# Patient Record
Sex: MALE | Race: BLACK OR AFRICAN AMERICAN | NOT HISPANIC OR LATINO | URBAN - METROPOLITAN AREA
[De-identification: names, ages, dates, MRNs, and addresses within clinical notes are randomized per-mention and may not be internally consistent; named-entity substitution may affect disease eponyms.]

---

## 2018-08-01 ENCOUNTER — INPATIENT (INPATIENT)
Facility: HOSPITAL | Age: 53
LOS: 0 days | Discharge: HOME | End: 2018-08-02
Attending: INTERNAL MEDICINE | Admitting: INTERNAL MEDICINE

## 2018-08-01 VITALS
SYSTOLIC BLOOD PRESSURE: 162 MMHG | TEMPERATURE: 98 F | RESPIRATION RATE: 19 BRPM | DIASTOLIC BLOOD PRESSURE: 102 MMHG | OXYGEN SATURATION: 100 % | HEART RATE: 76 BPM

## 2018-08-01 LAB
ALBUMIN SERPL ELPH-MCNC: 4.5 G/DL — SIGNIFICANT CHANGE UP (ref 3.5–5.2)
ALBUMIN SERPL ELPH-MCNC: 4.7 G/DL — SIGNIFICANT CHANGE UP (ref 3.5–5.2)
ALP SERPL-CCNC: 91 U/L — SIGNIFICANT CHANGE UP (ref 30–115)
ALP SERPL-CCNC: 97 U/L — SIGNIFICANT CHANGE UP (ref 30–115)
ALT FLD-CCNC: 52 U/L — HIGH (ref 0–41)
ALT FLD-CCNC: 53 U/L — HIGH (ref 0–41)
ANION GAP SERPL CALC-SCNC: 14 MMOL/L — SIGNIFICANT CHANGE UP (ref 7–14)
ANION GAP SERPL CALC-SCNC: 15 MMOL/L — HIGH (ref 7–14)
APTT BLD: 32.1 SEC — SIGNIFICANT CHANGE UP (ref 27–39.2)
AST SERPL-CCNC: 39 U/L — SIGNIFICANT CHANGE UP (ref 0–41)
AST SERPL-CCNC: 53 U/L — HIGH (ref 0–41)
BASOPHILS # BLD AUTO: 0.06 K/UL — SIGNIFICANT CHANGE UP (ref 0–0.2)
BASOPHILS NFR BLD AUTO: 1 % — SIGNIFICANT CHANGE UP (ref 0–1)
BILIRUB SERPL-MCNC: 0.6 MG/DL — SIGNIFICANT CHANGE UP (ref 0.2–1.2)
BILIRUB SERPL-MCNC: 0.7 MG/DL — SIGNIFICANT CHANGE UP (ref 0.2–1.2)
BUN SERPL-MCNC: 13 MG/DL — SIGNIFICANT CHANGE UP (ref 10–20)
BUN SERPL-MCNC: 14 MG/DL — SIGNIFICANT CHANGE UP (ref 10–20)
CALCIUM SERPL-MCNC: 9.2 MG/DL — SIGNIFICANT CHANGE UP (ref 8.5–10.1)
CALCIUM SERPL-MCNC: 9.2 MG/DL — SIGNIFICANT CHANGE UP (ref 8.5–10.1)
CHLORIDE SERPL-SCNC: 101 MMOL/L — SIGNIFICANT CHANGE UP (ref 98–110)
CHLORIDE SERPL-SCNC: 104 MMOL/L — SIGNIFICANT CHANGE UP (ref 98–110)
CK SERPL-CCNC: 992 U/L — HIGH (ref 0–225)
CO2 SERPL-SCNC: 23 MMOL/L — SIGNIFICANT CHANGE UP (ref 17–32)
CO2 SERPL-SCNC: 26 MMOL/L — SIGNIFICANT CHANGE UP (ref 17–32)
CREAT SERPL-MCNC: 0.9 MG/DL — SIGNIFICANT CHANGE UP (ref 0.7–1.5)
CREAT SERPL-MCNC: 1 MG/DL — SIGNIFICANT CHANGE UP (ref 0.7–1.5)
EOSINOPHIL # BLD AUTO: 0.18 K/UL — SIGNIFICANT CHANGE UP (ref 0–0.7)
EOSINOPHIL NFR BLD AUTO: 2.9 % — SIGNIFICANT CHANGE UP (ref 0–8)
GLUCOSE SERPL-MCNC: 90 MG/DL — SIGNIFICANT CHANGE UP (ref 70–99)
GLUCOSE SERPL-MCNC: 92 MG/DL — SIGNIFICANT CHANGE UP (ref 70–99)
HCT VFR BLD CALC: 40.5 % — LOW (ref 42–52)
HGB BLD-MCNC: 13.5 G/DL — LOW (ref 14–18)
IMM GRANULOCYTES NFR BLD AUTO: 0.3 % — SIGNIFICANT CHANGE UP (ref 0.1–0.3)
INR BLD: 1.07 RATIO — SIGNIFICANT CHANGE UP (ref 0.65–1.3)
LYMPHOCYTES # BLD AUTO: 2.5 K/UL — SIGNIFICANT CHANGE UP (ref 1.2–3.4)
LYMPHOCYTES # BLD AUTO: 40.5 % — SIGNIFICANT CHANGE UP (ref 20.5–51.1)
MCHC RBC-ENTMCNC: 28.7 PG — SIGNIFICANT CHANGE UP (ref 27–31)
MCHC RBC-ENTMCNC: 33.3 G/DL — SIGNIFICANT CHANGE UP (ref 32–37)
MCV RBC AUTO: 86.2 FL — SIGNIFICANT CHANGE UP (ref 80–94)
MONOCYTES # BLD AUTO: 0.37 K/UL — SIGNIFICANT CHANGE UP (ref 0.1–0.6)
MONOCYTES NFR BLD AUTO: 6 % — SIGNIFICANT CHANGE UP (ref 1.7–9.3)
NEUTROPHILS # BLD AUTO: 3.05 K/UL — SIGNIFICANT CHANGE UP (ref 1.4–6.5)
NEUTROPHILS NFR BLD AUTO: 49.3 % — SIGNIFICANT CHANGE UP (ref 42.2–75.2)
NRBC # BLD: 0 /100 WBCS — SIGNIFICANT CHANGE UP (ref 0–0)
PLATELET # BLD AUTO: 176 K/UL — SIGNIFICANT CHANGE UP (ref 130–400)
POTASSIUM SERPL-MCNC: 3.9 MMOL/L — SIGNIFICANT CHANGE UP (ref 3.5–5)
POTASSIUM SERPL-MCNC: 4.6 MMOL/L — SIGNIFICANT CHANGE UP (ref 3.5–5)
POTASSIUM SERPL-SCNC: 3.9 MMOL/L — SIGNIFICANT CHANGE UP (ref 3.5–5)
POTASSIUM SERPL-SCNC: 4.6 MMOL/L — SIGNIFICANT CHANGE UP (ref 3.5–5)
PROT SERPL-MCNC: 7.6 G/DL — SIGNIFICANT CHANGE UP (ref 6–8)
PROT SERPL-MCNC: 7.7 G/DL — SIGNIFICANT CHANGE UP (ref 6–8)
PROTHROM AB SERPL-ACNC: 11.5 SEC — SIGNIFICANT CHANGE UP (ref 9.95–12.87)
RBC # BLD: 4.7 M/UL — SIGNIFICANT CHANGE UP (ref 4.7–6.1)
RBC # FLD: 13.3 % — SIGNIFICANT CHANGE UP (ref 11.5–14.5)
SODIUM SERPL-SCNC: 141 MMOL/L — SIGNIFICANT CHANGE UP (ref 135–146)
SODIUM SERPL-SCNC: 142 MMOL/L — SIGNIFICANT CHANGE UP (ref 135–146)
TROPONIN T SERPL-MCNC: <0.01 NG/ML — SIGNIFICANT CHANGE UP
WBC # BLD: 6.18 K/UL — SIGNIFICANT CHANGE UP (ref 4.8–10.8)
WBC # FLD AUTO: 6.18 K/UL — SIGNIFICANT CHANGE UP (ref 4.8–10.8)

## 2018-08-01 RX ORDER — FLUTICASONE FUROATE AND VILANTEROL TRIFENATATE 100; 25 UG/1; UG/1
1 POWDER RESPIRATORY (INHALATION)
Qty: 0 | Refills: 0 | COMMUNITY

## 2018-08-01 RX ORDER — BUDESONIDE AND FORMOTEROL FUMARATE DIHYDRATE 160; 4.5 UG/1; UG/1
2 AEROSOL RESPIRATORY (INHALATION)
Qty: 0 | Refills: 0 | Status: DISCONTINUED | OUTPATIENT
Start: 2018-08-01 | End: 2018-08-02

## 2018-08-01 RX ORDER — ASPIRIN/CALCIUM CARB/MAGNESIUM 324 MG
325 TABLET ORAL ONCE
Qty: 0 | Refills: 0 | Status: COMPLETED | OUTPATIENT
Start: 2018-08-01 | End: 2018-08-01

## 2018-08-01 RX ORDER — ALBUTEROL 90 UG/1
2 AEROSOL, METERED ORAL
Qty: 0 | Refills: 0 | COMMUNITY

## 2018-08-01 RX ORDER — ESOMEPRAZOLE MAGNESIUM 40 MG/1
0 CAPSULE, DELAYED RELEASE ORAL
Qty: 0 | Refills: 0 | COMMUNITY

## 2018-08-01 RX ORDER — AMLODIPINE BESYLATE 2.5 MG/1
1 TABLET ORAL
Qty: 0 | Refills: 0 | COMMUNITY

## 2018-08-01 RX ORDER — ALBUTEROL 90 UG/1
2 AEROSOL, METERED ORAL EVERY 6 HOURS
Qty: 0 | Refills: 0 | Status: DISCONTINUED | OUTPATIENT
Start: 2018-08-01 | End: 2018-08-02

## 2018-08-01 RX ORDER — ALBUTEROL 90 UG/1
0 AEROSOL, METERED ORAL
Qty: 0 | Refills: 0 | COMMUNITY

## 2018-08-01 RX ORDER — SODIUM CHLORIDE 9 MG/ML
3 INJECTION INTRAMUSCULAR; INTRAVENOUS; SUBCUTANEOUS ONCE
Qty: 0 | Refills: 0 | Status: COMPLETED | OUTPATIENT
Start: 2018-08-01 | End: 2018-08-01

## 2018-08-01 RX ORDER — AMLODIPINE BESYLATE 2.5 MG/1
10 TABLET ORAL DAILY
Qty: 0 | Refills: 0 | Status: DISCONTINUED | OUTPATIENT
Start: 2018-08-01 | End: 2018-08-02

## 2018-08-01 RX ORDER — PANTOPRAZOLE SODIUM 20 MG/1
40 TABLET, DELAYED RELEASE ORAL
Qty: 0 | Refills: 0 | Status: DISCONTINUED | OUTPATIENT
Start: 2018-08-01 | End: 2018-08-02

## 2018-08-01 RX ADMIN — BUDESONIDE AND FORMOTEROL FUMARATE DIHYDRATE 2 PUFF(S): 160; 4.5 AEROSOL RESPIRATORY (INHALATION) at 21:36

## 2018-08-01 RX ADMIN — AMLODIPINE BESYLATE 10 MILLIGRAM(S): 2.5 TABLET ORAL at 21:35

## 2018-08-01 RX ADMIN — SODIUM CHLORIDE 3 MILLILITER(S): 9 INJECTION INTRAMUSCULAR; INTRAVENOUS; SUBCUTANEOUS at 14:20

## 2018-08-01 RX ADMIN — Medication 325 MILLIGRAM(S): at 14:29

## 2018-08-01 RX ADMIN — PANTOPRAZOLE SODIUM 40 MILLIGRAM(S): 20 TABLET, DELAYED RELEASE ORAL at 21:35

## 2018-08-01 NOTE — ED ADULT NURSE NOTE - NSIMPLEMENTINTERV_GEN_ALL_ED
Implemented All Universal Safety Interventions:  [place] to call system. Call bell, personal items and telephone within reach. Instruct patient to call for assistance. Room bathroom lighting operational. Non-slip footwear when patient is off stretcher. Physically safe environment: no spills, clutter or unnecessary equipment. Stretcher in lowest position, wheels locked, appropriate side rails in place.

## 2018-08-01 NOTE — H&P ADULT - NSHPLABSRESULTS_GEN_ALL_CORE
13.5   6.18  )-----------( 176      ( 01 Aug 2018 13:47 )             40.5\    08-01    142  |  101  |  13  ----------------------------<  90  3.9   |  26  |  0.9    Ca    9.2      01 Aug 2018 15:20    TPro  7.6  /  Alb  4.5  /  TBili  0.7  /  DBili  x   /  AST  39  /  ALT  52<H>  /  AlkPhos  97  08-01      CARDIAC MARKERS ( 01 Aug 2018 13:47 )  Trop <0.01 ng/mL /  U/L<H> / CKMB x

## 2018-08-01 NOTE — H&P ADULT - ASSESSMENT
53 y/o male with  pmh of HTN, asthma, GERD presents to ER for the atypical chest pain.    # Atypical chest pain  - Tele monitoring   - Cardiac enzymes negative  - EKG shows t wave inversions in inferior leads  - ECHO  - Consider exercise stress test   - Cardiology evaluation     # HTN   - C/w AMlodipine     # GERD  - Protonix    # DVT ppx: Low risk ambulation 53 y/o male with  pmh of HTN, asthma, GERD presents to ER for the atypical chest pain.    # Atypical chest pain  - Tele monitoring   - Cardiac enzymes negative  - EKG shows t wave inversions in inferior leads  - ECHO  - Consider exercise stress test   - Cardiology evaluation     # HTN   - C/w AMlodipine     # Asthma   - Stable   - C/w home medications    # GERD  - Protonix    # DVT ppx: Low risk ambulation 51 y/o male with  pmh of HTN, asthma, GERD presents to ER for the atypical chest pain.    # Atypical chest pain rule out ACS   - Tele monitoring   - Cardiac enzymes negative  - EKG shows t wave inversions in inferior leads  - ECHO  - Consider exercise stress test   - Cardiology evaluation     # HTN   - C/w AMlodipine     # Asthma   - Stable   - C/w home medications    # GERD  - Protonix    # DVT ppx: Low risk ambulation

## 2018-08-01 NOTE — ED PROVIDER NOTE - NS ED ROS FT
Constitutional:  no fevers, no chills, no malaise  Eyes:  No visual changes  ENMT: No neck pain or stiffness, no nasal congestion, no ear pain, no throat pain  Cardiac:  see hpi  Respiratory:  No cough or sob  GI:  No nausea, vomiting, diarrhea or abdominal pain.  :  No dysuria, frequency or burning.  MS:  No back pain, no joint pain.  Neuro:  No headache, no dizziness, no change in mental status  Skin:  No skin rash  Except as documented in the HPI,  all other systems are negative

## 2018-08-01 NOTE — H&P ADULT - ATTENDING COMMENTS
Pt seen and examined independently of resident, agree with above history, physical exam, assessment and plan  Addendum    1- Chest pain  r/o ACS  once rules out than styress test as recommended by cardio    2- HTN  continue home meds    3- GERD  continue PPIs

## 2018-08-01 NOTE — H&P ADULT - NSHPPHYSICALEXAM_GEN_ALL_CORE
Constitutional: Well built well nourished NAD     Neck: Supple     Respiratory: CTABL No rhonchi, crackles     Cardiovascular: Normal S1 S2 no m/r/g     Gastrointestinal: NT, ND No HSM      Extremities: No cyanosis, Clubbing     Neurological: AAO x 3

## 2018-08-01 NOTE — ED PROVIDER NOTE - OBJECTIVE STATEMENT
53 y/o male with h/o HTN, asthma, GERD in ER with c/o CP/tightness off and on for the past ~ 6 weeks.  non-exertional, sometimes pain to front of chest, sometimes to back, can last all day or will come and go.  no assoc sob.  no n/v/d.  no ha/dizziness/loc.  no le pain/swelling.  no h/o smoking.  had a stress test done many yrs ago.

## 2018-08-01 NOTE — ED PROVIDER NOTE - MEDICAL DECISION MAKING DETAILS
pt in ER with ~ 6 weeks of waxing and waning CP.  ekg with downsloping st segments inf leads and V4-V6, no old to compare.  trop neg.  pt admitted to tele for cardiac evaluation.

## 2018-08-01 NOTE — H&P ADULT - HISTORY OF PRESENT ILLNESS
53 y/o male with  pmh of HTN, asthma, GERD presents to ER for the chest tightness for a month. Pt denies any severe chest pain radiating to the jaw or the shoulder or back. Denies sob, nausea, vomiting or leg swelling.  Patient walks e mile every day without getting SOB.     In ED patient was found to have ST t wave changes in inferior leads.

## 2018-08-02 VITALS — HEART RATE: 78 BPM | DIASTOLIC BLOOD PRESSURE: 97 MMHG | SYSTOLIC BLOOD PRESSURE: 135 MMHG

## 2018-08-02 LAB
CK MB CFR SERPL CALC: 6.6 NG/ML — HIGH (ref 0.6–6.3)
CK SERPL-CCNC: 692 U/L — HIGH (ref 0–225)
TROPONIN T SERPL-MCNC: <0.01 NG/ML — SIGNIFICANT CHANGE UP

## 2018-08-02 RX ORDER — ACETAMINOPHEN 500 MG
650 TABLET ORAL EVERY 6 HOURS
Qty: 0 | Refills: 0 | Status: DISCONTINUED | OUTPATIENT
Start: 2018-08-02 | End: 2018-08-02

## 2018-08-02 RX ADMIN — PANTOPRAZOLE SODIUM 40 MILLIGRAM(S): 20 TABLET, DELAYED RELEASE ORAL at 17:50

## 2018-08-02 RX ADMIN — Medication 650 MILLIGRAM(S): at 08:39

## 2018-08-02 RX ADMIN — AMLODIPINE BESYLATE 10 MILLIGRAM(S): 2.5 TABLET ORAL at 17:50

## 2018-08-02 NOTE — PROGRESS NOTE ADULT - SUBJECTIVE AND OBJECTIVE BOX
JOAQUÍN SINCLAIR 52y Male  MRN#: [id number]   CODE STATUS:________      SUBJECTIVE  Patient is a 52y old Male who presents with a chief complaint of Chest tightness (01 Aug 2018 20:28)    he is currently admitted to medicine with the primary diagnosis of Chest tightness    Today is hospital day 1d, and this morning he is eating his breakfast without distress. Pt wishes to go home today.    No acute overnight events.     OBJECTIVE  PAST MEDICAL & SURGICAL HISTORY  Hypertension  Asthma  No significant past surgical history    ALLERGIES:  No Known Allergies    MEDICATIONS:  STANDING MEDICATIONS  amLODIPine   Tablet 10 milliGRAM(s) Oral daily  buDESOnide 160 MICROgram(s)/formoterol 4.5 MICROgram(s) Inhaler 2 Puff(s) Inhalation two times a day  pantoprazole    Tablet 40 milliGRAM(s) Oral before breakfast    PRN MEDICATIONS  acetaminophen   Tablet. 650 milliGRAM(s) Oral every 6 hours PRN  ALBUTerol    90 MICROgram(s) HFA Inhaler 2 Puff(s) Inhalation every 6 hours PRN    HOME MEDICATIONS  Home Medications:  albuterol 90 mcg/inh inhalation aerosol: 2 puff(s) inhaled 4 times a day, As Needed (01 Aug 2018 20:33)  amLODIPine 10 mg oral tablet: 1 tab(s) orally once a day (01 Aug 2018 13:36)  Breo Ellipta 100 mcg-25 mcg/inh inhalation powder: 1 puff(s) inhaled once a day (01 Aug 2018 13:36)  NexIUM:  (01 Aug 2018 13:36)      VITAL SIGNS: Last 24 Hours  T(C): 35.9 (02 Aug 2018 05:39), Max: 36.7 (01 Aug 2018 15:46)  T(F): 96.7 (02 Aug 2018 05:39), Max: 98.1 (01 Aug 2018 15:46)  HR: 55 (02 Aug 2018 05:39) (55 - 62)  BP: 134/74 (02 Aug 2018 05:39) (134/74 - 174/104)  BP(mean): --  RR: 18 (02 Aug 2018 05:39) (18 - 18)  SpO2: --    LABS:                        13.5   6.18  )-----------( 176      ( 01 Aug 2018 13:47 )             40.5     08-01    142  |  101  |  13  ----------------------------<  90  3.9   |  26  |  0.9    Ca    9.2      01 Aug 2018 15:20    TPro  7.6  /  Alb  4.5  /  TBili  0.7  /  DBili  x   /  AST  39  /  ALT  52<H>  /  AlkPhos  97  08-01    LIVER FUNCTIONS - ( 01 Aug 2018 15:20 )  Alb: 4.5 g/dL / Pro: 7.6 g/dL / ALK PHOS: 97 U/L / ALT: 52 U/L / AST: 39 U/L / GGT: x           PT/INR - ( 01 Aug 2018 13:47 )   PT: 11.50 sec;   INR: 1.07 ratio         PTT - ( 01 Aug 2018 13:47 )  PTT:32.1 sec      Creatine Kinase, Serum: 992 U/L <H> (08-01-18 @ 13:47)  Troponin T, Serum: <0.01 ng/mL (08-01-18 @ 13:47)      CARDIAC MARKERS ( 01 Aug 2018 13:47 )  x     / <0.01 ng/mL / 992 U/L / x     / x          CAPILLARY BLOOD GLUCOSE    RADIOLOGY:  < from: Xray Chest 2 Views PA/Lat (08.01.18 @ 14:04) >  Impression:      No radiographic evidence of acute cardiopulmonary disease.    < end of copied text >      PHYSICAL EXAM:    GENERAL: NAD, well-developed, AAOx3  HEENT:  Atraumatic, Normocephalic. EOMI, conjunctiva and sclera clear, No JVD  PULMONARY: Clear to auscultation bilaterally; No wheeze  CARDIOVASCULAR: Regular rate and rhythm; No murmurs, rubs, or gallops  GASTROINTESTINAL: Soft, Nontender, Nondistended; Bowel sounds present  MUSCULOSKELETAL:  2+ Peripheral Pulses, No clubbing, cyanosis, or edema  NEUROLOGY: non-focal  SKIN: No rashes or lesions      ADMISSION SUMMARY  Patient is a 52y old Male who presents with a chief complaint of Chest tightness (01 Aug 2018 20:28)     he currently admitted to medicine with the primary diagnosis of Chest pain      ASSESSMENT & PLAN    #Chest pain      #DVT ppx:  #GI ppx:  #Planned Disposition:  -[prognosis: good, fair, poor, grim]  -[destination] JOAQUÍ[place] SINCLAIR 52y Male  MRN#: [id number]   CODE STATUS:________      SUBJECTIVE  Patient is a 52y old Male who presents with a chief complaint of Chest tightness (01 Aug 2018 20:28)    he is currently admitted to medicine with the primary diagnosis of Chest tightness    Today is hospital day 1d, and this morning he is eating his breakfast without distress. Pt wishes to go home today.    No acute overnight events.     OBJECTIVE  PAST MEDICAL & SURGICAL HISTORY  Hypertension  Asthma  No significant past surgical history    ALLERGIES:  No Known Allergies    MEDICATIONS:  STANDING MEDICATIONS  amLODIPine   Tablet 10 milliGRAM(s) Oral daily  buDESOnide 160 MICROgram(s)/formoterol 4.5 MICROgram(s) Inhaler 2 Puff(s) Inhalation two times a day  pantoprazole    Tablet 40 milliGRAM(s) Oral before breakfast    PRN MEDICATIONS  acetaminophen   Tablet. 650 milliGRAM(s) Oral every 6 hours PRN  ALBUTerol    90 MICROgram(s) HFA Inhaler 2 Puff(s) Inhalation every 6 hours PRN    HOME MEDICATIONS  Home Medications:  albuterol 90 mcg/inh inhalation aerosol: 2 puff(s) inhaled 4 times a day, As Needed (01 Aug 2018 20:33)  amLODIPine 10 mg oral tablet: 1 tab(s) orally once a day (01 Aug 2018 13:36)  Breo Ellipta 100 mcg-25 mcg/inh inhalation powder: 1 puff(s) inhaled once a day (01 Aug 2018 13:36)  NexIUM:  (01 Aug 2018 13:36)      VITAL SIGNS: Last 24 Hours  T(C): 35.9 (02 Aug 2018 05:39), Max: 36.7 (01 Aug 2018 15:46)  T(F): 96.7 (02 Aug 2018 05:39), Max: 98.1 (01 Aug 2018 15:46)  HR: 55 (02 Aug 2018 05:39) (55 - 62)  BP: 134/74 (02 Aug 2018 05:39) (134/74 - 174/104)  BP(mean): --  RR: 18 (02 Aug 2018 05:39) (18 - 18)  SpO2: --    LABS:                        13.5   6.18  )-----------( 176      ( 01 Aug 2018 13:47 )             40.5     08-01    142  |  101  |  13  ----------------------------<  90  3.9   |  26  |  0.9    Ca    9.2      01 Aug 2018 15:20    TPro  7.6  /  Alb  4.5  /  TBili  0.7  /  DBili  x   /  AST  39  /  ALT  52<H>  /  AlkPhos  97  08-01    LIVER FUNCTIONS - ( 01 Aug 2018 15:20 )  Alb: 4.5 g/dL / Pro: 7.6 g/dL / ALK PHOS: 97 U/L / ALT: 52 U/L / AST: 39 U/L / GGT: x           PT/INR - ( 01 Aug 2018 13:47 )   PT: 11.50 sec;   INR: 1.07 ratio         PTT - ( 01 Aug 2018 13:47 )  PTT:32.1 sec      Creatine Kinase, Serum: 992 U/L <H> (08-01-18 @ 13:47)  Troponin T, Serum: <0.01 ng/mL (08-01-18 @ 13:47)      CARDIAC MARKERS ( 01 Aug 2018 13:47 )  x     / <0.01 ng/mL / 992 U/L / x     / x          CAPILLARY BLOOD GLUCOSE    RADIOLOGY:  < from: Xray Chest 2 Views PA/Lat (08.01.18 @ 14:04) >  Impression:      No radiographic evidence of acute cardiopulmonary disease.    < end of copied text >      PHYSICAL EXAM:    GENERAL: NAD, well-developed, AAOx3  HEENT:  Atraumatic, Normocephalic. EOMI, conjunctiva and sclera clear, No JVD  PULMONARY: Clear to auscultation bilaterally; No wheeze  CARDIOVASCULAR: Regular rate and rhythm; No murmurs, rubs, or gallops  GASTROINTESTINAL: Soft, Nontender, Nondistended; Bowel sounds present  MUSCULOSKELETAL:  2+ Peripheral Pulses, No clubbing, cyanosis, or edema  NEUROLOGY: non-focal  SKIN: No rashes or lesions      ADMISSION SUMMARY  Patient is a 52y old Male who presents with a chief complaint of Chest tightness (01 Aug 2018 20:28)     he currently admitted to medicine with the primary diagnosis of Chest pain      ASSESSMENT & PLAN    53 yo M presents with chest tightness with EKG showing T wave inversions in inferior leads.     # chest tightness r/o ACS  -1st set CE negative (no CKMB was ordered). 2nd set CE (troponin and CKMB) ordered 8/2 and pending.  -TTE shows EF of 60%, normal LV size and function, mild TVR and PVR  - trend CE, exercise stress test after CE return    # HTN   - C/w Amlodipine     # Asthma - Stable   - C/w home medications    # GERD  - Protonix    # DVT ppx: Low risk ambulation JOAQUÍN SINCLAIR 52y Male  MRN#: [id number]   CODE STATUS:________      SUBJECTIVE  Patient is a 52y old Male who presents with a chief complaint of Chest tightness (01 Aug 2018 20:28)    he is currently admitted to medicine with the primary diagnosis of Chest tightness    Today is hospital day 1d, and this morning he is eating his breakfast without distress. Pt wishes to go home today.    No acute overnight events.     OBJECTIVE  PAST MEDICAL & SURGICAL HISTORY  Hypertension  Asthma  No significant past surgical history    ALLERGIES:  No Known Allergies    MEDICATIONS:  STANDING MEDICATIONS  amLODIPine   Tablet 10 milliGRAM(s) Oral daily  buDESOnide 160 MICROgram(s)/formoterol 4.5 MICROgram(s) Inhaler 2 Puff(s) Inhalation two times a day  pantoprazole    Tablet 40 milliGRAM(s) Oral before breakfast    PRN MEDICATIONS  acetaminophen   Tablet. 650 milliGRAM(s) Oral every 6 hours PRN  ALBUTerol    90 MICROgram(s) HFA Inhaler 2 Puff(s) Inhalation every 6 hours PRN    HOME MEDICATIONS  Home Medications:  albuterol 90 mcg/inh inhalation aerosol: 2 puff(s) inhaled 4 times a day, As Needed (01 Aug 2018 20:33)  amLODIPine 10 mg oral tablet: 1 tab(s) orally once a day (01 Aug 2018 13:36)  Breo Ellipta 100 mcg-25 mcg/inh inhalation powder: 1 puff(s) inhaled once a day (01 Aug 2018 13:36)  NexIUM:  (01 Aug 2018 13:36)      VITAL SIGNS: Last 24 Hours  T(C): 35.9 (02 Aug 2018 05:39), Max: 36.7 (01 Aug 2018 15:46)  T(F): 96.7 (02 Aug 2018 05:39), Max: 98.1 (01 Aug 2018 15:46)  HR: 55 (02 Aug 2018 05:39) (55 - 62)  BP: 134/74 (02 Aug 2018 05:39) (134/74 - 174/104)  BP(mean): --  RR: 18 (02 Aug 2018 05:39) (18 - 18)  SpO2: --    LABS:                        13.5   6.18  )-----------( 176      ( 01 Aug 2018 13:47 )             40.5     08-01    142  |  101  |  13  ----------------------------<  90  3.9   |  26  |  0.9    Ca    9.2      01 Aug 2018 15:20    TPro  7.6  /  Alb  4.5  /  TBili  0.7  /  DBili  x   /  AST  39  /  ALT  52<H>  /  AlkPhos  97  08-01    LIVER FUNCTIONS - ( 01 Aug 2018 15:20 )  Alb: 4.5 g/dL / Pro: 7.6 g/dL / ALK PHOS: 97 U/L / ALT: 52 U/L / AST: 39 U/L / GGT: x           PT/INR - ( 01 Aug 2018 13:47 )   PT: 11.50 sec;   INR: 1.07 ratio         PTT - ( 01 Aug 2018 13:47 )  PTT:32.1 sec      Creatine Kinase, Serum: 992 U/L <H> (08-01-18 @ 13:47)  Troponin T, Serum: <0.01 ng/mL (08-01-18 @ 13:47)      CARDIAC MARKERS ( 01 Aug 2018 13:47 )  x     / <0.01 ng/mL / 992 U/L / x     / x          CAPILLARY BLOOD GLUCOSE    RADIOLOGY:  < from: Xray Chest 2 Views PA/Lat (08.01.18 @ 14:04) >  Impression:      No radiographic evidence of acute cardiopulmonary disease.    < end of copied text >      PHYSICAL EXAM:    GENERAL: NAD, well-developed, AAOx3  HEENT:  Atraumatic, Normocephalic. EOMI, conjunctiva and sclera clear, No JVD  PULMONARY: Clear to auscultation bilaterally; No wheeze  CARDIOVASCULAR: Regular rate and rhythm; No murmurs, rubs, or gallops  GASTROINTESTINAL: Soft, Nontender, Nondistended; Bowel sounds present  MUSCULOSKELETAL:  2+ Peripheral Pulses, No clubbing, cyanosis, or edema  NEUROLOGY: non-focal  SKIN: No rashes or lesions      ADMISSION SUMMARY  Patient is a 52y old Male who presents with a chief complaint of Chest tightness (01 Aug 2018 20:28)     he currently admitted to medicine with the primary diagnosis of Chest pain      ASSESSMENT & PLAN    51 yo M presents with chest tightness with EKG showing T wave inversions in inferior leads.     # chest tightness r/o ACS  -1st set CE negative (no CKMB was ordered). 2nd set CE (troponin and CKMB) ordered 8/2 and pending.  -TTE shows EF of 60%, normal LV size and function, mild TVR and PVR  - trend CE, exercise stress test after CE return  -d/c home once stress test is negative    # HTN   - C/w Amlodipine     # Asthma - Stable   - C/w home medications    # GERD  - Protonix    # DVT ppx: Low risk ambulation     #Disposition: from home

## 2018-08-02 NOTE — MEDICAL STUDENT PROGRESS NOTE(EDUCATION) - SUBJECTIVE AND OBJECTIVE BOX
51 y/o M w/ PMH HTN, asthma, GERD presents to ER for intermittent chest tightness for one month. The tightness happened around the same time that he started going back to the gym. The tightness does not radiate to the jaw or shoulder and has no alleviating or aggravating factors. The tightness is brought on by positional changes and deep inspiration, especially when the weather is humid. The patient denies any SOB, BOX, n/v, or palpitations. He walks a mile everyday w/o SOB. 53 y/o M w/ PMH HTN, asthma, GERD presents to ER for intermittent chest tightness for one month. The tightness happened around the same time that he started going back to the gym. The tightness does not radiate to the jaw or shoulder and has no alleviating or aggravating factors. The tightness is brought on by positional changes and deep inspiration, especially when the weather is humid. The patient denies any SOB, BOX, n/v, or palpitations. He walks a mile everyday w/o SOB.     Vitals  Temperature (F): 96.7 Degrees F  Heart Rate (beats/min): 55 /min  Noninvasive Blood Pressure: 134/74 mm Hg  Respiration Rate (breaths/min): 18 /min    ROS:  Constitutional: No fever, chill, sweats  Eye: No recent visual problem  ENMT: No ear pain, nasal congestion, throat pain  Respiratoty: No SOB, cough  Cardiovascular: No chest pain, palpitaion, syncope  Gastrointestinal: No nausea, vomitting, diarhea  Genitourinary: No dysuria, hematuria  Heam/Lymp: No brusing tendency, no swollen glands  Endocrine: Negative for excessive hunger, thirst  Musculoskeletal: No neck pain, back pain, joint pain  Intergumentory: No rash, skin lesions  Neurologic: alert and oriented    PAST MEDICAL & SURGICAL HISTORY  Hypertension  Asthma  No significant past surgical history    FAMILY HISTORY:  FAMILY HISTORY:  No pertinent family history in first degree relatives    SOCIAL HISTORY:  Denies smoking, alcohol    ALLERGIES:  No Known Allergies    MEDICATIONS:  MEDICATIONS  (STANDING):  amLODIPine   Tablet 10 milliGRAM(s) Oral daily  buDESOnide 160 MICROgram(s)/formoterol 4.5 MICROgram(s) Inhaler 2 Puff(s) Inhalation two times a day  pantoprazole    Tablet 40 milliGRAM(s) Oral before breakfast    MEDICATIONS  (PRN):  ALBUTerol    90 MICROgram(s) HFA Inhaler 2 Puff(s) Inhalation every 6 hours PRN Bronchospasm      HOME MEDICATIONS:  Home Medications:  albuterol 90 mcg/inh inhalation aerosol: 2 puff(s) inhaled 4 times a day, As Needed (01 Aug 2018 20:33)  amLODIPine 10 mg oral tablet: 1 tab(s) orally once a day (01 Aug 2018 13:36)  Breo Ellipta 100 mcg-25 mcg/inh inhalation powder: 1 puff(s) inhaled once a day (01 Aug 2018 13:36)  NexIUM:  (01 Aug 2018 13:36)    PHYSICAL EXAM:  GEN: Alert and oriented X 3, Well nourished, No acute distress  NECK: Supple, non tender, NO JVD, No carotid bruit,   LUNGS: Clear to auscultation bilaterally, non labored respiration  CARDIOVASCULAR: S1/S2 present, RRR , no murmus or rubs,   ABD: Soft, non-tender, non-distended,   EXT: No Lower extreimity edema, no tenderness  NEURO: Non focal  SKIN: Intact    LABS:                        13.5   6.18  )-----------( 176      ( 01 Aug 2018 13:47 )             40.5     08-01    142  |  101  |  13  ----------------------------<  90  3.9   |  26  |  0.9    Ca    9.2      01 Aug 2018 15:20    TPro  7.6  /  Alb  4.5  /  TBili  0.7  /  DBili  x   /  AST  39  /  ALT  52<H>  /  AlkPhos  97  08-01    PT/INR - ( 01 Aug 2018 13:47 )   PT: 11.50 sec;   INR: 1.07 ratio         PTT - ( 01 Aug 2018 13:47 )  PTT:32.1 sec  Creatine Kinase, Serum: 992 U/L <H> (08-01-18 @ 13:47)  Troponin T, Serum: <0.01 ng/mL (08-01-18 @ 13:47)    CARDIAC MARKERS ( 01 Aug 2018 13:47 )  x     / <0.01 ng/mL / 992 U/L / x     / x          RADIOLOGY:  -CXR:    < from: Xray Chest 2 Views PA/Lat (08.01.18 @ 14:04) >  Impression:      No radiographic evidence of acute cardiopulmonary disease.    < end of copied text >    -TTE:    Summary:   1. LV Ejection Fraction by Cote's Method with a biplane EF of 60 %.   2. Normal left ventricular size and wall thicknesses, with normal   systolic function.   3. Mild tricuspid regurgitation.   4. Pulmonic valve regurgitation.    ECG: NSR@75 bpm, ST/T changes inf/lateral leads 53 y/o M w/ PMH HTN, asthma, GERD presents to ER for intermittent chest tightness for one month. The tightness happened around the same time that he started going back to the gym. The tightness does not radiate to the jaw or shoulder and has no alleviating or aggravating factors. The tightness is brought on by positional changes and deep inspiration, especially when the weather is humid. The patient denies any SOB, BOX, n/v, or palpitations. He walks a mile everyday w/o SOB.     Hospital Course: Patient came into the hospital with concern for his intermittent chest tightness that he has been having for one month. He was sent to the ED on 8/1 where he was found to have ST and T wave inversion in inferior leads suggestive of inferolateral ischemia. He was then sent to telemetry where a first set of cardiac enzymes (troponin T) was ordered, which were found to be negative. The primary team ordered a TTE (which came back negative on 8/2) and consulted cardiology, who recommended us to trend the cardiac enzymes and order an exercise stress test. On 8/2, a second set of cardiac enzymes (troponin T and CK-MB) was ordered and found that the patient had increased CK-MB (6.6) and CK (692) levels, suggestive of muscle breakdown most likely secondary to strenuous exercise. Cardiology consult recommended the patient do the exercise stress test as outpatient and to discharge the patient from telemetry today on 8/2.     Vitals  Temperature (F): 96.7 Degrees F  Heart Rate (beats/min): 55 /min  Noninvasive Blood Pressure: 134/74 mm Hg  Respiration Rate (breaths/min): 18 /min    ROS:  Constitutional: No fever, chill, sweats  Eye: No recent visual problem  ENMT: No ear pain, nasal congestion, throat pain  Respiratoty: No SOB, cough  Cardiovascular: No chest pain, palpitaion, syncope  Gastrointestinal: No nausea, vomitting, diarhea  Genitourinary: No dysuria, hematuria  Heam/Lymp: No brusing tendency, no swollen glands  Endocrine: Negative for excessive hunger, thirst  Musculoskeletal: No neck pain, back pain, joint pain  Intergumentory: No rash, skin lesions  Neurologic: alert and oriented    PAST MEDICAL & SURGICAL HISTORY  Hypertension  Asthma  No significant past surgical history    FAMILY HISTORY:  FAMILY HISTORY:  No pertinent family history in first degree relatives    SOCIAL HISTORY:  Denies smoking, alcohol    ALLERGIES:  No Known Allergies    MEDICATIONS:  MEDICATIONS  (STANDING):  amLODIPine   Tablet 10 milliGRAM(s) Oral daily  buDESOnide 160 MICROgram(s)/formoterol 4.5 MICROgram(s) Inhaler 2 Puff(s) Inhalation two times a day  pantoprazole    Tablet 40 milliGRAM(s) Oral before breakfast    MEDICATIONS  (PRN):  ALBUTerol    90 MICROgram(s) HFA Inhaler 2 Puff(s) Inhalation every 6 hours PRN Bronchospasm      HOME MEDICATIONS:  Home Medications:  albuterol 90 mcg/inh inhalation aerosol: 2 puff(s) inhaled 4 times a day, As Needed (01 Aug 2018 20:33)  amLODIPine 10 mg oral tablet: 1 tab(s) orally once a day (01 Aug 2018 13:36)  Breo Ellipta 100 mcg-25 mcg/inh inhalation powder: 1 puff(s) inhaled once a day (01 Aug 2018 13:36)  NexIUM:  (01 Aug 2018 13:36)    PHYSICAL EXAM:  GEN: Alert and oriented X 3, Well nourished, No acute distress  NECK: Supple, non tender, NO JVD, No carotid bruit,   LUNGS: Clear to auscultation bilaterally, non labored respiration  CARDIOVASCULAR: S1/S2 present, RRR , no murmus or rubs,   ABD: Soft, non-tender, non-distended,   EXT: No Lower extreimity edema, no tenderness  NEURO: Non focal  SKIN: Intact    LABS:                        13.5   6.18  )-----------( 176      ( 01 Aug 2018 13:47 )             40.5     08-01    142  |  101  |  13  ----------------------------<  90  3.9   |  26  |  0.9    Ca    9.2      01 Aug 2018 15:20    TPro  7.6  /  Alb  4.5  /  TBili  0.7  /  DBili  x   /  AST  39  /  ALT  52<H>  /  AlkPhos  97  08-01    PT/INR - ( 01 Aug 2018 13:47 )   PT: 11.50 sec;   INR: 1.07 ratio         PTT - ( 01 Aug 2018 13:47 )  PTT:32.1 sec  Creatine Kinase, Serum: 992 U/L <H> (08-01-18 @ 13:47)  Troponin T, Serum: <0.01 ng/mL (08-01-18 @ 13:47)    CARDIAC MARKERS ( 01 Aug 2018 13:47 )  x     / <0.01 ng/mL / 992 U/L / x     / x          RADIOLOGY:  -CXR:    < from: Xray Chest 2 Views PA/Lat (08.01.18 @ 14:04) >  Impression:      No radiographic evidence of acute cardiopulmonary disease.    < end of copied text >    -TTE:    Summary:   1. LV Ejection Fraction by Cote's Method with a biplane EF of 60 %.   2. Normal left ventricular size and wall thicknesses, with normal   systolic function.   3. Mild tricuspid regurgitation.   4. Pulmonic valve regurgitation.    ECG: NSR@75 bpm, ST/T changes inf/lateral leads

## 2018-08-02 NOTE — CONSULT NOTE ADULT - SUBJECTIVE AND OBJECTIVE BOX
Chief complaint:  Chest pain    HPI:  51 y/o male with  pmh of HTN, asthma, GERD presents to ER for the chest tightness for a month. Pain no radiating, non exertional lasts for few mins and goes away on its own, not associated with SOB, palpitation, He walks few miles everyday but does not get chest pain, on arrival to ER pt has non specific ST changes in inf leads.       ROS:  Constitutional: No fever, chill, sweats  Eye: No recent visual problem  ENMT: No ear pain, nasal congestion, throat pain  Respiratoty: No SOB, cough  Cardiovascular: No chest pain, palpitaion, syncope  Gastrointestinal: No nausea, vomitting, diarhea  Genitourinary: No dysuria, hematuria  Heam/Lymp: No brusing tendency, no swollen glands  Endocrine: Negative for excessive hunger, thirst  Musculoskeletal: No neck pain, back pain, joint pain  Intergumentory: No rash, skin lesions  Neurologic: alert and oriented    PAST MEDICAL & SURGICAL HISTORY  Hypertension  Asthma  No significant past surgical history    FAMILY HISTORY:  FAMILY HISTORY:  No pertinent family history in first degree relatives    SOCIAL HISTORY:  Denies smoking, alcohol    ALLERGIES:  No Known Allergies    MEDICATIONS:  MEDICATIONS  (STANDING):  amLODIPine   Tablet 10 milliGRAM(s) Oral daily  buDESOnide 160 MICROgram(s)/formoterol 4.5 MICROgram(s) Inhaler 2 Puff(s) Inhalation two times a day  pantoprazole    Tablet 40 milliGRAM(s) Oral before breakfast    MEDICATIONS  (PRN):  ALBUTerol    90 MICROgram(s) HFA Inhaler 2 Puff(s) Inhalation every 6 hours PRN Bronchospasm      HOME MEDICATIONS:  Home Medications:  albuterol 90 mcg/inh inhalation aerosol: 2 puff(s) inhaled 4 times a day, As Needed (01 Aug 2018 20:33)  amLODIPine 10 mg oral tablet: 1 tab(s) orally once a day (01 Aug 2018 13:36)  Breo Ellipta 100 mcg-25 mcg/inh inhalation powder: 1 puff(s) inhaled once a day (01 Aug 2018 13:36)  NexIUM:  (01 Aug 2018 13:36)    VITALS:   T(F): 98.1 (08-01 @ 15:46), Max: 98.2 (08-01 @ 12:12)  HR: 62 (08-01 @ 15:46) (62 - 76)  BP: 160/98 (08-01 @ 20:32) (160/98 - 174/104)  BP(mean): --  RR: 18 (08-01 @ 15:46) (18 - 19)  SpO2: 100% (08-01 @ 12:12) (100% - 100%)    PHYSICAL EXAM:  GEN: Alert and oriented X 3, Well nourished, No acute distress  NECK: Supple, non tender, NO JVD, No carotid bruit,   LUNGS: Clear to auscultation bilaterally, non labored respiration  CARDIOVASCULAR: S1/S2 present, RRR , no murmus or rubs,   ABD: Soft, non-tender, non-distended,   EXT: No Lower extreimity edema, no tenderness  NEURO: Non focal  SKIN: Intact    LABS:                        13.5   6.18  )-----------( 176      ( 01 Aug 2018 13:47 )             40.5     08-01    142  |  101  |  13  ----------------------------<  90  3.9   |  26  |  0.9    Ca    9.2      01 Aug 2018 15:20    TPro  7.6  /  Alb  4.5  /  TBili  0.7  /  DBili  x   /  AST  39  /  ALT  52<H>  /  AlkPhos  97  08-01    PT/INR - ( 01 Aug 2018 13:47 )   PT: 11.50 sec;   INR: 1.07 ratio         PTT - ( 01 Aug 2018 13:47 )  PTT:32.1 sec  Creatine Kinase, Serum: 992 U/L <H> (08-01-18 @ 13:47)  Troponin T, Serum: <0.01 ng/mL (08-01-18 @ 13:47)    CARDIAC MARKERS ( 01 Aug 2018 13:47 )  x     / <0.01 ng/mL / 992 U/L / x     / x          RADIOLOGY:  -CXR:    < from: Xray Chest 2 Views PA/Lat (08.01.18 @ 14:04) >  Impression:      No radiographic evidence of acute cardiopulmonary disease.    < end of copied text >    -TTE:  -CCTA:  -STRESS TEST:  -CATHETERIZATION:    ECG: NSR@75 bpm, ST/T changes inf/letral leads    TELEMETRY EVENTS: None Chief complaint:  Chest pain    HPI:  51 y/o male with  pmh of HTN, asthma, GERD presents to ER for the chest tightness for a month. Pain no radiating, non exertional lasts for few mins and goes away on its own, not associated with SOB, palpitation, He walks few miles everyday but does not get chest pain, on arrival to ER pt has non specific ST changes in inf leads.       ROS:  Constitutional: No fever, chill, sweats  Eye: No recent visual problem  ENMT: No ear pain, nasal congestion, throat pain  Respiratoty: No SOB, cough  Cardiovascular: as above  Gastrointestinal: No nausea, vomitting, diarhea  Genitourinary: No dysuria, hematuria  Heam/Lymp: No brusing tendency, no swollen glands  Endocrine: Negative for excessive hunger, thirst  Musculoskeletal: No neck pain, back pain, joint pain  Intergumentory: No rash, skin lesions  Neurologic: alert and oriented    PAST MEDICAL & SURGICAL HISTORY  Hypertension  Asthma  No significant past surgical history    FAMILY HISTORY:  FAMILY HISTORY:  No pertinent family history in first degree relatives    SOCIAL HISTORY:  Denies smoking, alcohol    ALLERGIES:  No Known Allergies    MEDICATIONS:  MEDICATIONS  (STANDING):  amLODIPine   Tablet 10 milliGRAM(s) Oral daily  buDESOnide 160 MICROgram(s)/formoterol 4.5 MICROgram(s) Inhaler 2 Puff(s) Inhalation two times a day  pantoprazole    Tablet 40 milliGRAM(s) Oral before breakfast    MEDICATIONS  (PRN):  ALBUTerol    90 MICROgram(s) HFA Inhaler 2 Puff(s) Inhalation every 6 hours PRN Bronchospasm      HOME MEDICATIONS:  Home Medications:  albuterol 90 mcg/inh inhalation aerosol: 2 puff(s) inhaled 4 times a day, As Needed (01 Aug 2018 20:33)  amLODIPine 10 mg oral tablet: 1 tab(s) orally once a day (01 Aug 2018 13:36)  Breo Ellipta 100 mcg-25 mcg/inh inhalation powder: 1 puff(s) inhaled once a day (01 Aug 2018 13:36)  NexIUM:  (01 Aug 2018 13:36)    VITALS:   T(F): 98.1 (08-01 @ 15:46), Max: 98.2 (08-01 @ 12:12)  HR: 62 (08-01 @ 15:46) (62 - 76)  BP: 160/98 (08-01 @ 20:32) (160/98 - 174/104)  BP(mean): --  RR: 18 (08-01 @ 15:46) (18 - 19)  SpO2: 100% (08-01 @ 12:12) (100% - 100%)    PHYSICAL EXAM:  GEN: Alert and oriented X 3, Well nourished, No acute distress  HEENT: NC/AT  NECK: Supple, non tender, NO JVD, No carotid bruit,   LUNGS: Clear to auscultation bilaterally, non labored respiration  CARDIOVASCULAR: S1/S2 present, RRR , no murmus or rubs,   ABD: Soft, non-tender, non-distended,   EXT: No Lower extreimity edema, no tenderness  NEURO: Non focal  PSYCH: AAO x3  SKIN: Intact    LABS:                        13.5   6.18  )-----------( 176      ( 01 Aug 2018 13:47 )             40.5     08-01    142  |  101  |  13  ----------------------------<  90  3.9   |  26  |  0.9    Ca    9.2      01 Aug 2018 15:20    TPro  7.6  /  Alb  4.5  /  TBili  0.7  /  DBili  x   /  AST  39  /  ALT  52<H>  /  AlkPhos  97  08-01    PT/INR - ( 01 Aug 2018 13:47 )   PT: 11.50 sec;   INR: 1.07 ratio         PTT - ( 01 Aug 2018 13:47 )  PTT:32.1 sec  Creatine Kinase, Serum: 992 U/L <H> (08-01-18 @ 13:47)  Troponin T, Serum: <0.01 ng/mL (08-01-18 @ 13:47)    CARDIAC MARKERS ( 01 Aug 2018 13:47 )  x     / <0.01 ng/mL / 992 U/L / x     / x          RADIOLOGY:  -CXR:    < from: Xray Chest 2 Views PA/Lat (08.01.18 @ 14:04) >  Impression:      No radiographic evidence of acute cardiopulmonary disease.    < end of copied text >    -TTE:  -CCTA:  -STRESS TEST:  -CATHETERIZATION:    ECG: NSR@75 bpm, ST/T changes inf/latral leads    TELEMETRY EVENTS: None

## 2018-08-02 NOTE — DISCHARGE NOTE ADULT - HOSPITAL COURSE
Patient came into the hospital with concern for his intermittent chest tightness that he has been having for one month. He was sent to the ED on 8/1 where he was found to have ST and T wave inversion in inferior leads suggestive of inferolateral ischemia. He was then sent to telemetry where a first set of cardiac enzymes (troponin T) was ordered, which were found to be negative. The primary team ordered a TTE (which came back negative on 8/2) and consulted cardiology, who recommended to trend the cardiac enzymes and order an exercise stress test. On 8/2, a second set of cardiac enzymes (troponin T and CK-MB) was ordered and found that the patient had increased CK-MB (6.6) and CK (692) levels. Cardiology consult recommended the patient do the exercise stress test and to discharge the patient from telemetry today on 8/2.     Pt is now medically stable for discharge. Pt further informed to return to the ED for worsening of symptoms. Patient came into the hospital with concern for his intermittent chest tightness that he has been having for one month. He was sent to the ED on 8/1 where he was found to have ST and T wave inversion in inferior leads suggestive of inferolateral ischemia. He was then sent to telemetry where a first set of cardiac enzymes (troponin T) was ordered, which were found to be negative. The primary team ordered a TTE (which came back negative on 8/2) and consulted cardiology, who recommended to trend the cardiac enzymes and order an exercise stress test. On 8/2, a second set of cardiac enzymes (troponin T and CK-MB) was ordered and found that the patient had increased CK-MB (6.6) and CK (692) levels. Cardiology consult recommended the patient do the exercise stress test and to discharge the patient from telemetry today on 8/2. Normal stress test.    Pt is now medically stable for discharge. Pt further informed to return to the ED for worsening of symptoms.

## 2018-08-02 NOTE — DISCHARGE NOTE ADULT - MEDICATION SUMMARY - MEDICATIONS TO TAKE
I will START or STAY ON the medications listed below when I get home from the hospital:    Breo Ellipta 100 mcg-25 mcg/inh inhalation powder  -- 1 puff(s) inhaled once a day  -- Indication: For Asthma    albuterol 90 mcg/inh inhalation aerosol  -- 2 puff(s) inhaled 4 times a day, As Needed  -- Indication: For Asthma    amLODIPine 10 mg oral tablet  -- 1 tab(s) by mouth once a day  -- Indication: For Hypertension    NexIUM  -- Indication: For GERD

## 2018-08-02 NOTE — DISCHARGE NOTE ADULT - CARE PLAN
Principal Discharge DX:	Chest tightness  Secondary Diagnosis:	Asthma  Secondary Diagnosis:	Hypertension Principal Discharge DX:	Chest tightness  Goal:	Resolved.  Assessment and plan of treatment:	Your chest tightness most likely due from musculoskeletal cause. We performed stress test for you.  Secondary Diagnosis:	Asthma  Goal:	Stable. Medical Management.  Assessment and plan of treatment:	Continue home medications for your asthma.  Secondary Diagnosis:	Hypertension  Goal:	Stable. Medical Management.  Assessment and plan of treatment:	Continue home medication amlodipine for your hypertension. Please check your blood pressure regularly. Follow up with your primary doctor. We recommend low salt diet and exercise as tolerated.

## 2018-08-02 NOTE — DISCHARGE NOTE ADULT - PATIENT PORTAL LINK FT
You can access the [facility] Patient Portal, offered by [facility], by registering with the following website: [email or web address]

## 2018-08-02 NOTE — MEDICAL STUDENT PROGRESS NOTE(EDUCATION) - NS MD HP STUD ASPLAN ASSES FT
53 y/o M w/ PMH HTN, asthma, GERD presents to ER for intermittent chest tightness for one month w/o radiation to the jaw, shoulder, or back, and w/o SOB, BOX, n/v, or palpitations found to have T wave inversion in inferior leads on EKG admitted for chest pain.

## 2018-08-02 NOTE — DISCHARGE NOTE ADULT - PLAN OF CARE
Resolved. Your chest tightness most likely due from musculoskeletal cause. We performed stress test for you. Stable. Medical Management. Continue home medications for your asthma. Continue home medication amlodipine for your hypertension. Please check your blood pressure regularly. Follow up with your primary doctor. We recommend low salt diet and exercise as tolerated.

## 2018-08-02 NOTE — MEDICAL STUDENT PROGRESS NOTE(EDUCATION) - NS MD HP STUD ASPLAN PLAN FT
1. chest tightness r/o ACS  -CE negative (no CKMB was ordered)  -TTE was ordered and performed as per the primary team.  - trend CE, exercise stress test recas per cardiology fellow c/s # chest tightness r/o ACS  -1st set CE negative (no CKMB was ordered). 2nd set CE (troponin and CKMB) ordered 8/2 and pending.  -TTE was ordered and performed as per the primary team.  1. LV Ejection Fraction by Cote's Method with a biplane EF of 60 %.   2. Normal left ventricular size and wall thicknesses, with normal   systolic function.   3. Mild tricuspid regurgitation.   4. Pulmonic valve regurgitation.  - trend CE, exercise stress test after CE return, EKG morning recommended as per cardiology fellow c/s.    # HTN   - C/w Amlodipine     # Asthma   - Stable   - C/w home medications    # GERD  - Protonix    # DVT ppx: Low risk ambulation # chest tightness r/o ACS  -1st set CE negative (no CKMB was ordered).  - 2nd set CE (troponin and CKMB) ordered 8/2: CKMB = 6.6, troponin < 0.01, CK = 692. likely muscle breakdown from strenuous exercise.  -TTE was ordered and performed as per the primary team.  1. LV Ejection Fraction by Cote's Method with a biplane EF of 60 %.  2. Normal left ventricular size and wall thicknesses, with normal   systolic function.  3. Mild tricuspid regurgitation.  4. Pulmonic valve regurgitation.  - trend CE, exercise stress test can be done as outpatient recommended as per cardiology fellow c/s.    # HTN   - C/w Amlodipine     # Asthma   - Stable   - C/w home medications    # GERD  - Protonix    # DVT ppx: Low risk ambulation

## 2018-08-02 NOTE — CONSULT NOTE ADULT - ASSESSMENT
53 yo M HTN, DM, GERD p/w     atypical chest pain  EKG changes  HTN  DLD    Tele  Trend CE  recommed exercise nuc stress test once CE neg  EKG in AM  will d/e attendning 53 yo M HTN, DM, GERD p/w     atypical chest pain  EKG changes  HTN  DLD    Tele  Trend CE  recommed exercise stress test once CE neg  EKG in AM  will d/w attendning    Attending: Patient seen and examined. D/w fellow. Echo noted.  Atypical chest discomfort. Excellent exertional capacity. (Walks 2 miles every morning)  Agree with exercise stress test - can be done as outpatient.

## 2018-08-02 NOTE — DISCHARGE NOTE ADULT - OTHER SIGNIFICANT FINDINGS
< from: Xray Chest 2 Views PA/Lat (08.01.18 @ 14:04) >  Impression:    No radiographic evidence of acute cardiopulmonary disease.  < end of copied text >      < from: 12 Lead ECG (08.01.18 @ 12:29) >  Diagnosis Line Normal sinus rhythm  ST & T wave abnormality, consider inferolateral ischemia  Abnormal ECG  < end of copied text > < from: Xray Chest 2 Views PA/Lat (08.01.18 @ 14:04) >  Impression:    No radiographic evidence of acute cardiopulmonary disease.  < end of copied text >      < from: 12 Lead ECG (08.01.18 @ 12:29) >  Diagnosis Line Normal sinus rhythm  ST & T wave abnormality, consider inferolateral ischemia  Abnormal ECG  < end of copied text >    < from: Cardiac Treadmill Stress Test (Non Imaging)-Adult (08.02.18 @ 15:44) >  Summary      Time In Exercise Phase_^00:09:58^_      Max. Systolic BP_^180^_mmHg      Max Diastolic BP_^89^_mmHg      Max Heart Rate_^151^_BPM      Max Predicted Heart Rate_^166^_BPM      Target HR Formula_^(220 - Age)*100%^_      Reason for Test_^Chest Discomfort^_      Arrhythmias_^ventricular premature beats-isolated^_      Resting ECG_^NSR, ST and T wave abnormality^_      ST Changes_^none^_      Overall Impression_^Normal stress test^_      Chest Pain_^none^_      HR Response To Exercise_^appropriate^_      BP Response To Exercise_^resting hypertension - appropriate response^_      Reason For Termination_^Fatigue^_      Functional Capacity_^Normal^_     Medication Name&Dosage&Admin Method&Category amlodipine     Medication Name&Dosage&Admin Method&Category budesonide     Medication Name&Dosage&Admin Method&Category pantoprazole     Diagnois Line Normal stress test     Diagnois Line      Diagnois Line No symptoms with exercise. test terminated due to fatigue     Diagnois Line Confirmed by Travis Turner ([id number]) on 8/2/2018 5:42:11 PM    < end of copied text >

## 2018-08-07 DIAGNOSIS — R07.9 CHEST PAIN, UNSPECIFIED: ICD-10-CM

## 2018-08-07 DIAGNOSIS — J45.909 UNSPECIFIED ASTHMA, UNCOMPLICATED: ICD-10-CM

## 2018-08-07 DIAGNOSIS — K21.9 GASTRO-ESOPHAGEAL REFLUX DISEASE WITHOUT ESOPHAGITIS: ICD-10-CM

## 2018-08-07 DIAGNOSIS — I10 ESSENTIAL (PRIMARY) HYPERTENSION: ICD-10-CM

## 2018-12-02 NOTE — ED PROVIDER NOTE - DIAGNOSIS COUNSELING, MDM
conducted a detailed discussion... I had a detailed discussion with the patient and/or guardian regarding the historical points, exam findings, and any diagnostic results supporting the discharge/admit diagnosis. PHYSICAL EXAM:  GEN: No acute distress  LUNGS: Clear to auscultation bilaterally   HEART: S1/S2 present. RRR.   ABD: Soft, non-tender, non-distended. Bowel sounds present  EXT: NC/NC/NE/2+PP/ARBOLEDA  NEURO: [id number]
